# Patient Record
Sex: FEMALE | Race: WHITE
[De-identification: names, ages, dates, MRNs, and addresses within clinical notes are randomized per-mention and may not be internally consistent; named-entity substitution may affect disease eponyms.]

---

## 2017-02-22 ENCOUNTER — HOSPITAL ENCOUNTER (OUTPATIENT)
Dept: HOSPITAL 47 - LABWHC1 | Age: 52
Discharge: HOME | End: 2017-02-22
Payer: COMMERCIAL

## 2017-02-22 DIAGNOSIS — E03.9: ICD-10-CM

## 2017-02-22 DIAGNOSIS — Z79.899: ICD-10-CM

## 2017-02-22 DIAGNOSIS — Z51.81: Primary | ICD-10-CM

## 2017-02-22 LAB
ALT SERPL-CCNC: 22 U/L (ref 9–52)
CH: 32.6
CHCM: 33.9
ERYTHROCYTE [DISTWIDTH] IN BLOOD BY AUTOMATED COUNT: 4.28 M/UL (ref 3.8–5.4)
ERYTHROCYTE [DISTWIDTH] IN BLOOD: 13 % (ref 11.5–15.5)
HCT VFR BLD AUTO: 41.3 % (ref 34–46)
HDW: 2.45
HGB BLD-MCNC: 13.8 GM/DL (ref 11.4–16)
MCH RBC QN AUTO: 32.2 PG (ref 25–35)
MCHC RBC AUTO-ENTMCNC: 33.4 G/DL (ref 31–37)
MCV RBC AUTO: 96.7 FL (ref 80–100)
WBC # BLD AUTO: 5.9 K/UL (ref 3.8–10.6)

## 2017-02-22 PROCEDURE — 84443 ASSAY THYROID STIM HORMONE: CPT

## 2017-02-22 PROCEDURE — 84460 ALANINE AMINO (ALT) (SGPT): CPT

## 2017-02-22 PROCEDURE — 36415 COLL VENOUS BLD VENIPUNCTURE: CPT

## 2017-02-22 PROCEDURE — 80164 ASSAY DIPROPYLACETIC ACD TOT: CPT

## 2017-02-22 PROCEDURE — 84439 ASSAY OF FREE THYROXINE: CPT

## 2017-02-22 PROCEDURE — 85027 COMPLETE CBC AUTOMATED: CPT

## 2018-04-26 ENCOUNTER — HOSPITAL ENCOUNTER (OUTPATIENT)
Dept: HOSPITAL 47 - LABWHC1 | Age: 53
Discharge: HOME | End: 2018-04-26
Payer: COMMERCIAL

## 2018-04-26 DIAGNOSIS — F31.60: Primary | ICD-10-CM

## 2018-04-26 DIAGNOSIS — Z79.899: ICD-10-CM

## 2018-04-26 PROCEDURE — 80164 ASSAY DIPROPYLACETIC ACD TOT: CPT

## 2018-04-26 PROCEDURE — 36415 COLL VENOUS BLD VENIPUNCTURE: CPT

## 2018-07-16 ENCOUNTER — HOSPITAL ENCOUNTER (OUTPATIENT)
Dept: HOSPITAL 47 - LABWHC1 | Age: 53
Discharge: HOME | End: 2018-07-16
Payer: COMMERCIAL

## 2018-07-16 DIAGNOSIS — F31.60: Primary | ICD-10-CM

## 2018-07-16 DIAGNOSIS — E03.9: ICD-10-CM

## 2018-07-16 LAB
ALT SERPL-CCNC: 30 U/L (ref 9–52)
ERYTHROCYTE [DISTWIDTH] IN BLOOD BY AUTOMATED COUNT: 4.11 M/UL (ref 3.8–5.4)
ERYTHROCYTE [DISTWIDTH] IN BLOOD: 13 % (ref 11.5–15.5)
HCT VFR BLD AUTO: 38.8 % (ref 34–46)
HGB BLD-MCNC: 13.1 GM/DL (ref 11.4–16)
MCH RBC QN AUTO: 31.8 PG (ref 25–35)
MCHC RBC AUTO-ENTMCNC: 33.7 G/DL (ref 31–37)
MCV RBC AUTO: 94.5 FL (ref 80–100)
PLATELET # BLD AUTO: 258 K/UL (ref 150–450)
T4 FREE SERPL-MCNC: 1.13 NG/DL (ref 0.78–2.19)
WBC # BLD AUTO: 5.6 K/UL (ref 3.8–10.6)

## 2018-07-16 PROCEDURE — 80164 ASSAY DIPROPYLACETIC ACD TOT: CPT

## 2018-07-16 PROCEDURE — 84460 ALANINE AMINO (ALT) (SGPT): CPT

## 2018-07-16 PROCEDURE — 85027 COMPLETE CBC AUTOMATED: CPT

## 2018-07-16 PROCEDURE — 84439 ASSAY OF FREE THYROXINE: CPT

## 2018-07-16 PROCEDURE — 36415 COLL VENOUS BLD VENIPUNCTURE: CPT

## 2018-07-16 PROCEDURE — 84443 ASSAY THYROID STIM HORMONE: CPT

## 2018-09-17 ENCOUNTER — HOSPITAL ENCOUNTER (OUTPATIENT)
Dept: HOSPITAL 47 - RADMAMWWP | Age: 53
Discharge: HOME | End: 2018-09-17
Attending: FAMILY MEDICINE
Payer: COMMERCIAL

## 2018-09-17 DIAGNOSIS — Z12.31: Primary | ICD-10-CM

## 2018-09-17 PROCEDURE — 77067 SCR MAMMO BI INCL CAD: CPT

## 2018-09-17 PROCEDURE — 77063 BREAST TOMOSYNTHESIS BI: CPT

## 2018-09-19 NOTE — MM
Reason for exam: screening  (asymptomatic).

Last mammogram was performed 2 years and 10 months ago.



History:

Family history of breast cancer in paternal cousin at age 35.

Took hormonal contraceptives for 1 year.



Physical Findings:

A clinical breast exam by your physician is recommended on an annual basis and 

results should be correlated with mammographic findings.



MG 3D Screening Mammo W/Cad

Bilateral CC and MLO view(s) were taken.

Prior study comparison: November 13, 2015, right breast MG 3d diag mammo w/cad RT.

March 24, 2015, right breast MG work up mamm w CAD RT.

There are scattered fibroglandular densities.  No significant changes when 

compared with prior studies.





ASSESSMENT: Negative, BI-RAD 1



RECOMMENDATION:

Routine screening mammogram of both breasts in 1 year.

## 2019-02-09 ENCOUNTER — HOSPITAL ENCOUNTER (OUTPATIENT)
Dept: HOSPITAL 47 - LABWHC1 | Age: 54
Discharge: HOME | End: 2019-02-09
Attending: PSYCHIATRY & NEUROLOGY
Payer: COMMERCIAL

## 2019-02-09 DIAGNOSIS — F31.60: Primary | ICD-10-CM

## 2019-02-09 DIAGNOSIS — Z79.899: ICD-10-CM

## 2019-02-09 LAB
ALT SERPL-CCNC: 21 U/L (ref 8–44)
ERYTHROCYTE [DISTWIDTH] IN BLOOD BY AUTOMATED COUNT: 4.02 M/UL (ref 3.8–5.4)
ERYTHROCYTE [DISTWIDTH] IN BLOOD: 13 % (ref 11.5–15.5)
HCT VFR BLD AUTO: 39.7 % (ref 34–46)
HGB BLD-MCNC: 12.6 GM/DL (ref 11.4–16)
MCH RBC QN AUTO: 31.4 PG (ref 25–35)
MCHC RBC AUTO-ENTMCNC: 31.8 G/DL (ref 31–37)
MCV RBC AUTO: 98.8 FL (ref 80–100)
PLATELET # BLD AUTO: 249 K/UL (ref 150–450)
T4 FREE SERPL-MCNC: 1.2 NG/DL (ref 0.8–1.8)
WBC # BLD AUTO: 5.8 K/UL (ref 3.8–10.6)

## 2019-02-09 PROCEDURE — 85027 COMPLETE CBC AUTOMATED: CPT

## 2019-02-09 PROCEDURE — 84439 ASSAY OF FREE THYROXINE: CPT

## 2019-02-09 PROCEDURE — 84460 ALANINE AMINO (ALT) (SGPT): CPT

## 2019-02-09 PROCEDURE — 84443 ASSAY THYROID STIM HORMONE: CPT

## 2019-02-09 PROCEDURE — 36415 COLL VENOUS BLD VENIPUNCTURE: CPT

## 2019-02-09 PROCEDURE — 80164 ASSAY DIPROPYLACETIC ACD TOT: CPT

## 2020-02-11 ENCOUNTER — HOSPITAL ENCOUNTER (OUTPATIENT)
Dept: HOSPITAL 47 - LABWHC1 | Age: 55
Discharge: HOME | End: 2020-02-11
Attending: PSYCHIATRY & NEUROLOGY
Payer: COMMERCIAL

## 2020-02-11 DIAGNOSIS — R94.5: Primary | ICD-10-CM

## 2020-02-11 LAB
ALBUMIN SERPL-MCNC: 4.2 G/DL (ref 3.8–4.9)
ALBUMIN/GLOB SERPL: 2.33 G/DL (ref 1.6–3.17)
ALP SERPL-CCNC: 55 U/L (ref 41–126)
ALT SERPL-CCNC: 21 U/L (ref 8–44)
AST SERPL-CCNC: 21 U/L (ref 13–35)
GLOBULIN SER CALC-MCNC: 1.8 G/DL (ref 1.6–3.3)
PROT SERPL-MCNC: 6 G/DL (ref 6.2–8.2)

## 2020-02-11 PROCEDURE — 80076 HEPATIC FUNCTION PANEL: CPT

## 2020-02-11 PROCEDURE — 80164 ASSAY DIPROPYLACETIC ACD TOT: CPT

## 2020-02-11 PROCEDURE — 36415 COLL VENOUS BLD VENIPUNCTURE: CPT

## 2020-05-21 ENCOUNTER — HOSPITAL ENCOUNTER (OUTPATIENT)
Dept: HOSPITAL 47 - LABWHC1 | Age: 55
Discharge: HOME | End: 2020-05-21
Payer: COMMERCIAL

## 2020-05-21 DIAGNOSIS — L50.8: Primary | ICD-10-CM

## 2020-05-21 PROCEDURE — 82785 ASSAY OF IGE: CPT

## 2020-05-21 PROCEDURE — 36415 COLL VENOUS BLD VENIPUNCTURE: CPT

## 2020-05-21 PROCEDURE — 86800 THYROGLOBULIN ANTIBODY: CPT

## 2020-05-21 PROCEDURE — 86376 MICROSOMAL ANTIBODY EACH: CPT

## 2020-08-19 ENCOUNTER — HOSPITAL ENCOUNTER (OUTPATIENT)
Dept: HOSPITAL 47 - LABWHC1 | Age: 55
Discharge: HOME | End: 2020-08-19
Attending: PSYCHIATRY & NEUROLOGY
Payer: COMMERCIAL

## 2020-08-19 DIAGNOSIS — Z79.899: ICD-10-CM

## 2020-08-19 DIAGNOSIS — F31.60: Primary | ICD-10-CM

## 2020-08-19 LAB
ALBUMIN SERPL-MCNC: 4.3 G/DL (ref 3.8–4.9)
ALBUMIN/GLOB SERPL: 2.15 G/DL (ref 1.6–3.17)
ALP SERPL-CCNC: 42 U/L (ref 41–126)
ALT SERPL-CCNC: 14 U/L (ref 8–44)
AST SERPL-CCNC: 18 U/L (ref 13–35)
BASOPHILS # BLD AUTO: 0.1 K/UL (ref 0–0.2)
BASOPHILS NFR BLD AUTO: 1 %
BILIRUB INDIRECT SERPL-MCNC: 0.1 MG/DL
EOSINOPHIL # BLD AUTO: 0.1 K/UL (ref 0–0.7)
EOSINOPHIL NFR BLD AUTO: 2 %
ERYTHROCYTE [DISTWIDTH] IN BLOOD BY AUTOMATED COUNT: 4.13 M/UL (ref 3.8–5.4)
ERYTHROCYTE [DISTWIDTH] IN BLOOD: 12.4 % (ref 11.5–15.5)
GLOBULIN SER CALC-MCNC: 2 G/DL (ref 1.6–3.3)
HCT VFR BLD AUTO: 41.2 % (ref 34–46)
HGB BLD-MCNC: 13.3 GM/DL (ref 11.4–16)
LYMPHOCYTES # SPEC AUTO: 2.6 K/UL (ref 1–4.8)
LYMPHOCYTES NFR SPEC AUTO: 42 %
MCH RBC QN AUTO: 32.3 PG (ref 25–35)
MCHC RBC AUTO-ENTMCNC: 32.3 G/DL (ref 31–37)
MCV RBC AUTO: 99.9 FL (ref 80–100)
MONOCYTES # BLD AUTO: 0.4 K/UL (ref 0–1)
MONOCYTES NFR BLD AUTO: 6 %
NEUTROPHILS # BLD AUTO: 2.9 K/UL (ref 1.3–7.7)
NEUTROPHILS NFR BLD AUTO: 46 %
PLATELET # BLD AUTO: 231 K/UL (ref 150–450)
PROT SERPL-MCNC: 6.3 G/DL (ref 6.2–8.2)
WBC # BLD AUTO: 6.2 K/UL (ref 3.8–10.6)

## 2020-08-19 PROCEDURE — 36415 COLL VENOUS BLD VENIPUNCTURE: CPT

## 2020-08-19 PROCEDURE — 85025 COMPLETE CBC W/AUTO DIFF WBC: CPT

## 2020-08-19 PROCEDURE — 85652 RBC SED RATE AUTOMATED: CPT

## 2020-08-19 PROCEDURE — 80164 ASSAY DIPROPYLACETIC ACD TOT: CPT

## 2020-08-19 PROCEDURE — 80076 HEPATIC FUNCTION PANEL: CPT

## 2020-10-30 ENCOUNTER — HOSPITAL ENCOUNTER (OUTPATIENT)
Dept: HOSPITAL 47 - LABWHC1 | Age: 55
Discharge: HOME | End: 2020-10-30
Attending: PSYCHIATRY & NEUROLOGY
Payer: COMMERCIAL

## 2020-10-30 DIAGNOSIS — M32.9: Primary | ICD-10-CM

## 2020-10-30 PROCEDURE — 86140 C-REACTIVE PROTEIN: CPT

## 2020-10-30 PROCEDURE — 85652 RBC SED RATE AUTOMATED: CPT

## 2020-10-30 PROCEDURE — 86038 ANTINUCLEAR ANTIBODIES: CPT

## 2020-10-30 PROCEDURE — 36415 COLL VENOUS BLD VENIPUNCTURE: CPT

## 2020-10-30 PROCEDURE — 86431 RHEUMATOID FACTOR QUANT: CPT

## 2020-10-31 LAB — RHEUMATOID FACT SERPL-ACNC: 9 IU/ML (ref 0–15)

## 2020-11-05 ENCOUNTER — HOSPITAL ENCOUNTER (OUTPATIENT)
Dept: HOSPITAL 47 - LABWHC1 | Age: 55
Discharge: HOME | End: 2020-11-05
Attending: NURSE PRACTITIONER
Payer: COMMERCIAL

## 2020-11-05 DIAGNOSIS — Z20.9: ICD-10-CM

## 2020-11-05 DIAGNOSIS — Z00.00: Primary | ICD-10-CM

## 2020-11-05 LAB
ALBUMIN SERPL-MCNC: 4.3 G/DL (ref 3.8–4.9)
ALBUMIN/GLOB SERPL: 1.95 G/DL (ref 1.6–3.17)
ALP SERPL-CCNC: 43 U/L (ref 41–126)
ALT SERPL-CCNC: 18 U/L (ref 8–44)
ANION GAP SERPL CALC-SCNC: 6.8 MMOL/L (ref 4–12)
AST SERPL-CCNC: 22 U/L (ref 13–35)
BASOPHILS # BLD AUTO: 0.1 K/UL (ref 0–0.2)
BASOPHILS NFR BLD AUTO: 1 %
BUN SERPL-SCNC: 10 MG/DL (ref 9–27)
BUN/CREAT SERPL: 16.67 RATIO (ref 12–20)
CALCIUM SPEC-MCNC: 9.3 MG/DL (ref 8.7–10.3)
CHLORIDE SERPL-SCNC: 112 MMOL/L (ref 96–109)
CHOLEST SERPL-MCNC: 144 MG/DL (ref 0–200)
CO2 SERPL-SCNC: 25.2 MMOL/L (ref 21.6–31.8)
EOSINOPHIL # BLD AUTO: 0.1 K/UL (ref 0–0.7)
EOSINOPHIL NFR BLD AUTO: 2 %
ERYTHROCYTE [DISTWIDTH] IN BLOOD BY AUTOMATED COUNT: 4.04 M/UL (ref 3.8–5.4)
ERYTHROCYTE [DISTWIDTH] IN BLOOD: 13.3 % (ref 11.5–15.5)
GLOBULIN SER CALC-MCNC: 2.2 G/DL (ref 1.6–3.3)
GLUCOSE SERPL-MCNC: 83 MG/DL (ref 70–110)
HCT VFR BLD AUTO: 41.1 % (ref 34–46)
HDLC SERPL-MCNC: 44 MG/DL (ref 40–60)
HGB BLD-MCNC: 13.1 GM/DL (ref 11.4–16)
LDLC SERPL CALC-MCNC: 83.6 MG/DL (ref 0–131)
LYMPHOCYTES # SPEC AUTO: 2.6 K/UL (ref 1–4.8)
LYMPHOCYTES NFR SPEC AUTO: 44 %
MCH RBC QN AUTO: 32.4 PG (ref 25–35)
MCHC RBC AUTO-ENTMCNC: 31.8 G/DL (ref 31–37)
MCV RBC AUTO: 101.9 FL (ref 80–100)
MONOCYTES # BLD AUTO: 0.3 K/UL (ref 0–1)
MONOCYTES NFR BLD AUTO: 6 %
NEUTROPHILS # BLD AUTO: 2.6 K/UL (ref 1.3–7.7)
NEUTROPHILS NFR BLD AUTO: 46 %
PLATELET # BLD AUTO: 250 K/UL (ref 150–450)
POTASSIUM SERPL-SCNC: 4.4 MMOL/L (ref 3.5–5.5)
PROT SERPL-MCNC: 6.5 G/DL (ref 6.2–8.2)
SODIUM SERPL-SCNC: 144 MMOL/L (ref 135–145)
T4 FREE SERPL-MCNC: 1 NG/DL (ref 0.8–1.8)
TRIGL SERPL-MCNC: 82 MG/DL (ref 0–149)
VLDLC SERPL CALC-MCNC: 16.4 MG/DL (ref 5–40)
WBC # BLD AUTO: 5.8 K/UL (ref 3.8–10.6)

## 2020-11-05 PROCEDURE — 80061 LIPID PANEL: CPT

## 2020-11-05 PROCEDURE — 86803 HEPATITIS C AB TEST: CPT

## 2020-11-05 PROCEDURE — 36415 COLL VENOUS BLD VENIPUNCTURE: CPT

## 2020-11-05 PROCEDURE — 80053 COMPREHEN METABOLIC PANEL: CPT

## 2020-11-05 PROCEDURE — 84443 ASSAY THYROID STIM HORMONE: CPT

## 2020-11-05 PROCEDURE — 85025 COMPLETE CBC W/AUTO DIFF WBC: CPT

## 2020-11-05 PROCEDURE — 84439 ASSAY OF FREE THYROXINE: CPT

## 2020-11-13 ENCOUNTER — HOSPITAL ENCOUNTER (OUTPATIENT)
Dept: HOSPITAL 47 - LABWHC1 | Age: 55
Discharge: HOME | End: 2020-11-13
Attending: NURSE PRACTITIONER
Payer: COMMERCIAL

## 2020-11-13 DIAGNOSIS — Z20.828: Primary | ICD-10-CM

## 2020-12-04 ENCOUNTER — HOSPITAL ENCOUNTER (OUTPATIENT)
Dept: HOSPITAL 47 - RADUSWWP | Age: 55
Discharge: HOME | End: 2020-12-04
Attending: FAMILY MEDICINE
Payer: COMMERCIAL

## 2020-12-04 DIAGNOSIS — R10.9: Primary | ICD-10-CM

## 2020-12-04 PROCEDURE — 76705 ECHO EXAM OF ABDOMEN: CPT

## 2020-12-04 NOTE — US
EXAMINATION TYPE: US liver

 

DATE OF EXAM: 12/4/2020

 

COMPARISON: NONE

 

CLINICAL HISTORY: R10.9 abdominal pain. Pain cholecystectomy

 

EXAM MEASUREMENTS:

 

Liver Length:  12.8 cm   

Gallbladder Wall:  Surgically absent cm   

CBD:  .3 cm

Right Kidney:  9.2 x 3.3 x 4.8 cm

 

 

 

Pancreas:  wnl

Liver:  wnl  

Gallbladder:  Surgically absent

**Evidence for sonographic Trinidad's sign:  No

CBD:  wnl 

Right Kidney:  Simple appearing thin-walled cyst 1.2 x .9 x 1.2 cm 

 

Postcholecystectomy changes.

 

IMPRESSION: No acute findings evident.

## 2021-04-05 ENCOUNTER — HOSPITAL ENCOUNTER (OUTPATIENT)
Dept: HOSPITAL 47 - LABWHC1 | Age: 56
Discharge: HOME | End: 2021-04-05
Attending: FAMILY MEDICINE
Payer: COMMERCIAL

## 2021-04-05 DIAGNOSIS — Z20.822: Primary | ICD-10-CM

## 2021-05-26 ENCOUNTER — HOSPITAL ENCOUNTER (OUTPATIENT)
Dept: HOSPITAL 47 - LABWHC1 | Age: 56
Discharge: HOME | End: 2021-05-26
Attending: PSYCHIATRY & NEUROLOGY
Payer: COMMERCIAL

## 2021-05-26 DIAGNOSIS — F31.60: Primary | ICD-10-CM

## 2021-05-26 DIAGNOSIS — Z79.899: ICD-10-CM

## 2021-05-26 LAB
ERYTHROCYTE [DISTWIDTH] IN BLOOD BY AUTOMATED COUNT: 3.75 X 10*6/UL (ref 4.1–5.2)
ERYTHROCYTE [DISTWIDTH] IN BLOOD: 13.1 % (ref 11.5–14.5)
HCT VFR BLD AUTO: 37.8 % (ref 37.2–46.3)
HGB BLD-MCNC: 12.1 G/DL (ref 12–15)
MCH RBC QN AUTO: 32.3 PG (ref 27–32)
MCHC RBC AUTO-ENTMCNC: 32 G/DL (ref 32–37)
MCV RBC AUTO: 100.8 FL (ref 80–97)
PLATELET # BLD AUTO: 195 X 10*3/UL (ref 140–440)
WBC # BLD AUTO: 5.63 X 10*3/UL (ref 4.5–10)

## 2021-05-26 PROCEDURE — 80164 ASSAY DIPROPYLACETIC ACD TOT: CPT

## 2021-05-26 PROCEDURE — 84443 ASSAY THYROID STIM HORMONE: CPT

## 2021-05-26 PROCEDURE — 85027 COMPLETE CBC AUTOMATED: CPT

## 2021-05-26 PROCEDURE — 80053 COMPREHEN METABOLIC PANEL: CPT

## 2021-05-26 PROCEDURE — 36415 COLL VENOUS BLD VENIPUNCTURE: CPT

## 2021-05-27 LAB
ALBUMIN SERPL-MCNC: 4.2 G/DL (ref 3.8–4.9)
ALBUMIN/GLOB SERPL: 1.91 G/DL (ref 1.6–3.17)
ALP SERPL-CCNC: 59 U/L (ref 41–126)
ALT SERPL-CCNC: 20 U/L (ref 8–44)
ANION GAP SERPL CALC-SCNC: 8.7 MMOL/L (ref 4–12)
AST SERPL-CCNC: 15 U/L (ref 13–35)
BUN SERPL-SCNC: 10 MG/DL (ref 9–27)
BUN/CREAT SERPL: 16.67 RATIO (ref 12–20)
CALCIUM SPEC-MCNC: 9.5 MG/DL (ref 8.7–10.3)
CHLORIDE SERPL-SCNC: 110 MMOL/L (ref 96–109)
CO2 SERPL-SCNC: 26.3 MMOL/L (ref 21.6–31.8)
GLOBULIN SER CALC-MCNC: 2.2 G/DL (ref 1.6–3.3)
GLUCOSE SERPL-MCNC: 100 MG/DL (ref 70–110)
POTASSIUM SERPL-SCNC: 4.4 MMOL/L (ref 3.5–5.5)
PROT SERPL-MCNC: 6.4 G/DL (ref 6.2–8.2)
SODIUM SERPL-SCNC: 145 MMOL/L (ref 135–145)

## 2021-06-09 ENCOUNTER — HOSPITAL ENCOUNTER (OUTPATIENT)
Dept: HOSPITAL 47 - RADXRMAIN | Age: 56
Discharge: HOME | End: 2021-06-09
Attending: FAMILY MEDICINE
Payer: COMMERCIAL

## 2021-06-09 DIAGNOSIS — M76.892: ICD-10-CM

## 2021-06-09 DIAGNOSIS — M76.891: Primary | ICD-10-CM

## 2021-06-09 PROCEDURE — 73521 X-RAY EXAM HIPS BI 2 VIEWS: CPT

## 2021-06-09 NOTE — XR
EXAMINATION TYPE: XR knee complete LT

 

DATE OF EXAM: 6/9/2021

 

CLINICAL HISTORY: Osteoarthritis per order. Pain when walking.

 

TECHNIQUE:  Three views of the left knee are obtained.

 

COMPARISON: None.

 

FINDINGS:  There is no acute fracture/dislocation evident in left knee. Moderate narrowing and mild t
o moderate spurring medial tibiofemoral and patellofemoral compartments. A fabella is present. Increa
sed density consistent with small suprapatellar joint effusion.

 

IMPRESSION: As above.

## 2021-06-09 NOTE — XR
EXAMINATION TYPE: XR Hip Bilateral and AP pelvis

 

DATE OF EXAM: 6/9/2021

 

COMPARISON: NONE

 

HISTORY: Bilateral hip pain.

 

TECHNIQUE: A single AP view of the pelvis is obtained. Two views of the bilateral hips are obtained. 
 

 

FINDINGS:  There is no acute fracture/dislocation evident in the pelvis.  The sacroiliac joints appea
r symmetric and unremarkable. Pubic symphysis is intact. Scattered left-sided pelvic phleboliths note
d.

 

Two views of bilateral hips show no acute fracture or dislocation. Symmetric mild axial joint space l
oss with mild to moderate acetabular spurring bilaterally. Femoral head shapes are maintained bilater
ally.

 

 IMPRESSION: As above.

## 2022-02-07 ENCOUNTER — HOSPITAL ENCOUNTER (OUTPATIENT)
Dept: HOSPITAL 47 - LABWHC1 | Age: 57
Discharge: HOME | End: 2022-02-07
Attending: PSYCHIATRY & NEUROLOGY
Payer: COMMERCIAL

## 2022-02-07 DIAGNOSIS — F31.60: Primary | ICD-10-CM

## 2022-02-07 LAB
ALBUMIN SERPL-MCNC: 4.2 G/DL (ref 3.8–4.9)
ALBUMIN/GLOB SERPL: 1.91 G/DL (ref 1.6–3.17)
ALP SERPL-CCNC: 65 U/L (ref 41–126)
ALT SERPL-CCNC: 14 U/L (ref 8–44)
ANION GAP SERPL CALC-SCNC: 11.1 MMOL/L (ref 10–18)
AST SERPL-CCNC: 14 U/L (ref 13–35)
BUN SERPL-SCNC: 9.1 MG/DL (ref 9–27)
BUN/CREAT SERPL: 15.17 RATIO (ref 12–20)
CALCIUM SPEC-MCNC: 9.3 MG/DL (ref 8.7–10.3)
CHLORIDE SERPL-SCNC: 105 MMOL/L (ref 96–109)
CHOLEST SERPL-MCNC: 202 MG/DL (ref 0–200)
CO2 SERPL-SCNC: 25.9 MMOL/L (ref 20–27.5)
ERYTHROCYTE [DISTWIDTH] IN BLOOD BY AUTOMATED COUNT: 4.13 X 10*6/UL (ref 4.1–5.2)
ERYTHROCYTE [DISTWIDTH] IN BLOOD: 12.8 % (ref 11.5–14.5)
GLOBULIN SER CALC-MCNC: 2.2 G/DL (ref 1.6–3.3)
GLUCOSE SERPL-MCNC: 94 MG/DL (ref 70–110)
HCT VFR BLD AUTO: 40.7 % (ref 37.2–46.3)
HDLC SERPL-MCNC: 42.2 MG/DL (ref 40–60)
HGB BLD-MCNC: 13.1 G/DL (ref 12–15)
LDLC SERPL CALC-MCNC: 139.8 MG/DL (ref 0–131)
MCH RBC QN AUTO: 31.7 PG (ref 27–32)
MCHC RBC AUTO-ENTMCNC: 32.2 G/DL (ref 32–37)
MCV RBC AUTO: 98.5 FL (ref 80–97)
NRBC BLD AUTO-RTO: 0 /100 WBCS (ref 0–0)
PLATELET # BLD AUTO: 211 X 10*3/UL (ref 140–440)
POTASSIUM SERPL-SCNC: 4.6 MMOL/L (ref 3.5–5.5)
PROT SERPL-MCNC: 6.4 G/DL (ref 6.2–8.2)
SODIUM SERPL-SCNC: 142 MMOL/L (ref 135–145)
T4 FREE SERPL-MCNC: 1.06 NG/DL (ref 0.8–1.8)
TRIGL SERPL-MCNC: 100 MG/DL (ref 0–149)
VLDLC SERPL CALC-MCNC: 20 MG/DL (ref 5–40)
WBC # BLD AUTO: 5.3 X 10*3/UL (ref 4.5–10)

## 2022-02-07 PROCEDURE — 84439 ASSAY OF FREE THYROXINE: CPT

## 2022-02-07 PROCEDURE — 83036 HEMOGLOBIN GLYCOSYLATED A1C: CPT

## 2022-02-07 PROCEDURE — 80053 COMPREHEN METABOLIC PANEL: CPT

## 2022-02-07 PROCEDURE — 84443 ASSAY THYROID STIM HORMONE: CPT

## 2022-02-07 PROCEDURE — 80061 LIPID PANEL: CPT

## 2022-02-07 PROCEDURE — 36415 COLL VENOUS BLD VENIPUNCTURE: CPT

## 2022-02-07 PROCEDURE — 80164 ASSAY DIPROPYLACETIC ACD TOT: CPT

## 2022-02-07 PROCEDURE — 85027 COMPLETE CBC AUTOMATED: CPT

## 2022-02-14 ENCOUNTER — HOSPITAL ENCOUNTER (OUTPATIENT)
Dept: HOSPITAL 47 - RADMAMWWP | Age: 57
Discharge: HOME | End: 2022-02-14
Payer: COMMERCIAL

## 2022-02-14 DIAGNOSIS — Z12.31: Primary | ICD-10-CM

## 2022-02-14 DIAGNOSIS — Z80.3: ICD-10-CM

## 2022-02-14 PROCEDURE — 77067 SCR MAMMO BI INCL CAD: CPT

## 2022-02-14 PROCEDURE — 77063 BREAST TOMOSYNTHESIS BI: CPT

## 2022-02-15 NOTE — MM
Reason for exam: screening  (asymptomatic).

Last mammogram was performed 3 years and 5 months ago.



History:

Family history of breast cancer in paternal cousin at age 35.

Took hormonal contraceptives for 1 year.



Physical Findings:

A clinical breast exam by your physician is recommended on an annual basis and 

results should be correlated with mammographic findings.



MG 3D Screening Mammo W/Cad

Bilateral CC and MLO view(s) were taken.

Prior study comparison: September 17, 2018, bilateral MG 3d screening mammo w/cad.

November 13, 2015, right breast MG 3d diag mammo w/cad RT.

There are scattered fibroglandular densities.  There is chronic nodularity in the 

right breast. There is no discrete abnormality.





ASSESSMENT: Negative, BI-RAD 1



RECOMMENDATION:

Routine screening mammogram of both breasts in 1 year.

## 2023-03-23 ENCOUNTER — HOSPITAL ENCOUNTER (OUTPATIENT)
Dept: HOSPITAL 47 - RADMAMWWP | Age: 58
Discharge: HOME | End: 2023-03-23
Payer: COMMERCIAL

## 2023-03-23 DIAGNOSIS — Z12.31: Primary | ICD-10-CM

## 2023-03-23 DIAGNOSIS — Z80.3: ICD-10-CM

## 2023-03-23 DIAGNOSIS — Z78.0: ICD-10-CM

## 2023-03-23 PROCEDURE — 77063 BREAST TOMOSYNTHESIS BI: CPT

## 2023-03-23 PROCEDURE — 77067 SCR MAMMO BI INCL CAD: CPT

## 2023-03-24 NOTE — MM
Reason for Exam: Screening  (asymptomatic). 

Last mammogram was performed 1 year(s) and 1 month(s) ago. 





Patient History: 

Menarche at age 13. First Full-Term Pregnancy at age 18. Postmenopausal. Patient used Hormonal

Contraceptives for 1 year.

Paternal cousin had breast cancer, age 35. 





Risk Values: 

Romy 5 year model risk: 0.9%.

NCI Lifetime model risk: 5.1%.





Prior Study Comparison: 

[facility] 2/14/2022

[facility] 





Tissue Density: 

The breast tissue is heterogeneously dense. This may lower the sensitivity of mammography.





Findings: 

Analyzed By CAD. 

There is no suspicious group of microcalcifications or new suspicious mass in either breast. 





Overall Assessment: Negative, BI-RAD 1





Management: 

Screening Mammogram of both breasts in 1 year.

A clinical breast exam by your physician is recommended on an annual basis and results should be

correlated with mammographic findings.



Electronically signed and approved by: Leopold M. Fregoli, M.D. Radiologis

## 2023-04-20 ENCOUNTER — HOSPITAL ENCOUNTER (OUTPATIENT)
Dept: HOSPITAL 47 - RADXRMAIN | Age: 58
Discharge: HOME | End: 2023-04-20
Payer: COMMERCIAL

## 2023-04-20 DIAGNOSIS — J20.9: Primary | ICD-10-CM

## 2023-04-20 PROCEDURE — 71046 X-RAY EXAM CHEST 2 VIEWS: CPT

## 2023-04-21 NOTE — XR
EXAMINATION TYPE: XR chest 2V

 

DATE OF EXAM: 4/20/2023

 

COMPARISON: None

 

INDICATION: Chronic cough

 

TECHNIQUE:  Frontal and lateral views of the chest are obtained.

 

FINDINGS:  

The heart size is normal.  

The pulmonary vasculature is normal.

There is subtle tenting of the left diaphragm. Some minimal atelectasis may be present. Lungs otherwi
se appear clear..

 

IMPRESSION:  

1. Suggestion of minimal atelectasis of the left diaphragm in an otherwise unremarkable two-view ches
t

## 2023-04-26 ENCOUNTER — HOSPITAL ENCOUNTER (OUTPATIENT)
Dept: HOSPITAL 47 - RADNMMAIN | Age: 58
Discharge: HOME | End: 2023-04-26
Payer: COMMERCIAL

## 2023-04-26 DIAGNOSIS — R07.9: Primary | ICD-10-CM

## 2023-04-26 PROCEDURE — 93351 STRESS TTE COMPLETE: CPT

## 2023-04-28 NOTE — CA
Stress Echo 

 Report 

 

 Chandrika Moran 

 

 Age:    57     Gender:    F 

 

 :    1965 

 

 Exam Date:     2023 10:32 

 

 Exam Location: [facility] 

 

 Ht (in):     60     Wt (lb):    144 

 

 Ordering Physician:        Ramez Estrella MD 

 

 Referring Physician:       Sameer LAKE 

 

 Technician:                KRISTINE 

 Technologist 

 MRN:    [id number] 

 

 Procedure CPT: 

 

 Indication:        R07.9 CHEST PAIN, UNSPECIFIED 

 

 ICD-9 Codes: 

 

 Rhythm: 

 

 Patient History: 

 

 Cardiac Medications: 

 

 Medications in past 24 hours: 

 

 Contrast: 

 

 Stress Results 

 

 Protocol:     Abhinav 

 

 Total dose(mL): 

 

 Exercise Duration (min:sec):          7:20 

 Max ST Depression (mm): 

 Angina Score: 

 Huynh Score: 

 METS:    8.5 

 

 Resting HR:      85        Resting BP:     105    /   81 

 

 Peak HR:     159       Peak BP:     164   /   92 

 

 Max Predicted HR:       162 

 

 98     % Max Predicted HR 

 

 Target HR:     138 

 

 Double Product:       40658 

 

 Stress Summary: 

 

 BP Response: 

 

 Reason for Termination:        Reached target heart rate or work-load 

 

 Cardiac Symptoms:              NO SYMPTOMS 

 

 ECG Analysis 

 

 Resting ECG: 

 

 Stress ECG: 

 

 Arrhythmia: 

 Echo Analysis 

 

 Resting Echo: 

 

 Peak Echo Analysis: 

 

 MEASUREMENTS (Male/Female) Normal Values 

 

 

 

 

 CONCLUSIONS 

 Excellent exercise tolerance 

 Normal EKG and echo in response to exercise 

 

 Dr. Kal Zamora MD 

 (Electronically Signed) 

 Final Date:      2023 09:22

## 2023-10-30 ENCOUNTER — HOSPITAL ENCOUNTER (OUTPATIENT)
Dept: HOSPITAL 47 - RADXRMAIN | Age: 58
Discharge: HOME | End: 2023-10-30
Payer: COMMERCIAL

## 2023-10-30 DIAGNOSIS — J18.9: Primary | ICD-10-CM

## 2023-10-30 PROCEDURE — 71046 X-RAY EXAM CHEST 2 VIEWS: CPT

## 2023-10-30 NOTE — XR
EXAMINATION TYPE: XR chest 2V

 

DATE OF EXAM: 10/30/2023 12:10 PM

 

CLINICAL INDICATION:Female, 58 years old with history of J18.9 PNEUMONIA;

 

COMPARISON: Chest radiographs from 4/20/2023

 

TECHNIQUE: XR chest 2V Frontal and lateral views of the chest.

 

FINDINGS: 

Lungs/Pleura: There is no evidence of pleural effusion, focal consolidation, or pneumothorax.  

Pulmonary vascularity: Unremarkable.

Heart/mediastinum: Cardiomediastinal silhouette is unremarkable.

Musculoskeletal: No acute osseous pathology.

 

 

IMPRESSION: 

No acute cardiopulmonary disease/process.

## 2024-06-27 ENCOUNTER — HOSPITAL ENCOUNTER (OUTPATIENT)
Dept: HOSPITAL 47 - RADMAMWWP | Age: 59
Discharge: HOME | End: 2024-06-27
Payer: COMMERCIAL

## 2024-06-27 DIAGNOSIS — Z78.0: ICD-10-CM

## 2024-06-27 DIAGNOSIS — Z80.3: ICD-10-CM

## 2024-06-27 DIAGNOSIS — Z12.31: Primary | ICD-10-CM

## 2024-06-27 PROCEDURE — 77067 SCR MAMMO BI INCL CAD: CPT

## 2024-06-27 PROCEDURE — 77063 BREAST TOMOSYNTHESIS BI: CPT

## 2024-07-01 NOTE — MM
Reason for Exam: Screening  (asymptomatic). 

Last mammogram was performed 1 year(s) and 3 month(s) ago. 





Patient History: 

Menarche at age 13. First Full-Term Pregnancy at age 18. Postmenopausal. Patient has history of

breast feeding. Patient used Hormonal Contraceptives for 1 year.

Paternal cousin had breast cancer, age 35. 





Risk Values: 

Romy 5 year model risk: 0.9%.

NCI Lifetime model risk: 5.0%.





Prior Study Comparison: 

9/17/2018 Bilateral Screening Mammogram, [facility]. 2/14/2022 Bilateral Screening Mammogram, [facility].

3/23/2023 Bilateral MG 3D screening mammo w/cad, [facility]. 





Tissue Density: 

There are scattered areas of fibroglandular density.





Findings: 

Analyzed By CAD. 

Chronic nodularity medial right breast.

There is no suspicious group of microcalcifications or new suspicious mass in either breast. 





Overall Assessment: Benign, BI-RAD 2





Management: 

Screening Mammogram of both breasts in 1 year.

.



Patient should continue monthly self-breast exams.  A clinical breast exam by your physician is

recommended on an annual basis.

This exam should not preclude additional follow-up of suspicious palpable abnormalities.



Note on Romy scores and lifetime risk:

1. A Romy score greater than 3% is considered moderate risk. If this is the case, consider

specialist referral to assess eligibility for a risk reducing agent.

2. If overall lifetime risk for the development of breast cancer is 20% or higher, the patient may

qualify for future screening with alternating mammogram and breast MRI.



Electronically signed and approved by: Ariana James M.D. Radiologist